# Patient Record
Sex: FEMALE | Race: WHITE | Employment: FULL TIME | URBAN - METROPOLITAN AREA
[De-identification: names, ages, dates, MRNs, and addresses within clinical notes are randomized per-mention and may not be internally consistent; named-entity substitution may affect disease eponyms.]

---

## 2017-02-01 ENCOUNTER — HOSPITAL ENCOUNTER (OUTPATIENT)
Dept: BONE DENSITY | Age: 72
Discharge: HOME | End: 2017-02-01
Attending: FAMILY MEDICINE
Payer: MEDICARE

## 2017-02-01 DIAGNOSIS — Z78.0 ASYMPTOMATIC MENOPAUSAL STATE: ICD-10-CM

## 2017-02-01 PROCEDURE — 77080 DXA BONE DENSITY AXIAL: CPT

## 2017-03-30 ENCOUNTER — HOSPITAL ENCOUNTER (OUTPATIENT)
Dept: GENERAL RADIOLOGY | Age: 72
Discharge: HOME | End: 2017-03-30
Payer: MEDICARE

## 2017-03-30 DIAGNOSIS — J20.9 ACUTE BRONCHITIS: ICD-10-CM

## 2017-03-30 PROCEDURE — 71020 XR CHEST PA LAT: CPT

## 2017-05-02 DIAGNOSIS — R05.9 COUGH: Primary | ICD-10-CM

## 2017-05-02 NOTE — PROGRESS NOTES
Verbal Order with read back per Dr. Juanito Velasquez MD  For PFT smart panel. AMB POC PFT complete w/ bronchodilator  AMB POC PFT complete w/o bronchodilator    Dr. Juanito Velasquez MD will co-sign the orders.

## 2017-05-09 ENCOUNTER — OFFICE VISIT (OUTPATIENT)
Dept: PULMONOLOGY | Age: 72
End: 2017-05-09

## 2017-05-09 VITALS
HEIGHT: 64 IN | SYSTOLIC BLOOD PRESSURE: 150 MMHG | DIASTOLIC BLOOD PRESSURE: 80 MMHG | BODY MASS INDEX: 23.56 KG/M2 | TEMPERATURE: 97.7 F | OXYGEN SATURATION: 98 % | RESPIRATION RATE: 20 BRPM | WEIGHT: 138 LBS | HEART RATE: 75 BPM

## 2017-05-09 DIAGNOSIS — R05.9 COUGH: ICD-10-CM

## 2017-05-09 RX ORDER — HYDROCHLOROTHIAZIDE 25 MG/1
25 TABLET ORAL DAILY
COMMUNITY

## 2017-05-09 RX ORDER — BENZONATATE 100 MG/1
100 CAPSULE ORAL
COMMUNITY

## 2017-05-09 RX ORDER — ASPIRIN 81 MG/1
TABLET ORAL DAILY
COMMUNITY

## 2017-05-09 NOTE — MR AVS SNAPSHOT
Visit Information Date & Time Provider Department Dept. Phone Encounter #  
 5/9/2017  9:30 [facility] [id number] Follow-up Instructions Return in about 10 weeks (around 7/18/2017). Upcoming Health Maintenance Date Due Hepatitis C Screening 1945 DTaP/Tdap/Td series (1 - Tdap) 6/4/1967 ZOSTER VACCINE AGE 60> 6/4/2006 MEDICARE YEARLY EXAM 6/4/2011 GLAUCOMA SCREENING Q2Y 3/3/2016 COLONOSCOPY 2/3/2017 Pneumococcal 65+ Low/Medium Risk (2 of 2 - PPSV23) 6/11/2017 INFLUENZA AGE 9 TO ADULT 8/1/2017 BREAST CANCER SCRN MAMMOGRAM 8/4/2018 Allergies as of 5/9/2017  Review Complete On: 5/9/2017 By: RT Susi No Known Allergies Current Immunizations  Reviewed on 12/8/2014 Name Date Influenza High Dose Vaccine PF 11/9/2016 Influenza Vaccine 12/8/2014 Influenza Vaccine Whole 11/9/2011 Pneumococcal Vaccine (Unspecified Type) 6/11/2012 Not reviewed this visit You Were Diagnosed With   
  
 Codes Comments Cough     ICD-10-CM: R05 ICD-9-CM: 392. 2 Vitals BP Pulse Temp Resp Height(growth percentile) Weight(growth percentile) 150/80 (BP 1 Location: Left arm, BP Patient Position: At rest) 75 97.7 °F (36.5 °C) (Oral) 20 5' 4\" (1.626 m) 138 lb (62.6 kg) SpO2 BMI OB Status Smoking Status 98% 23.69 kg/m2 Postmenopausal Never Smoker BMI and BSA Data Body Mass Index Body Surface Area  
 23.69 kg/m 2 1.68 m 2 Preferred Pharmacy Pharmacy Name Phone [id number] [address] [phone] Your Updated Medication List  
  
   
This list is accurate as of: 5/9/17 10:12 AM.  Always use your most recent med list.  
  
  
  
  
 aspirin delayed-release 81 mg tablet Take  by mouth daily. AZOPT 1 % ophthalmic suspension Generic drug:  brinzolamide Administer 1 Drop to both eyes daily. benzonatate 100 mg capsule Commonly known as:  TESSALON Take 100 mg by mouth three (3) times daily as needed for Cough. cholecalciferol (VITAMIN D3) 5,000 unit Tab tablet Commonly known as:  VITAMIN D3 Take 5,000 Units by mouth daily. cyanocobalamin 500 mcg tablet Commonly known as:  VITAMIN B-12 Take 1 tablet by mouth daily. hydroCHLOROthiazide 25 mg tablet Commonly known as:  HYDRODIURIL Take 25 mg by mouth daily. olmesartan 20 mg tablet Commonly known as:  [employer] Take 20 mg by mouth daily. omeprazole 40 mg capsule Commonly known as:  PRILOSEC  
TAKE 1 CAPSULE DAILY BEFORE BREAKFAST *PLEASE CONTACT MD TO SCHEDULE FOLLOW UP FOR FUTURE REFILLS*  
  
 TUMS PO Take  by mouth nightly. XALATAN 0.005 % ophthalmic solution Generic drug:  latanoprost  
Administer 1 Drop to both eyes nightly. We Performed the Following AMB POC SPIROMETRY W/O BRONCHODILATOR [51036 CPT(R)] Follow-up Instructions Return in about 10 weeks (around 7/18/2017). Introducing [facility]! [facility] introduces [employer] patient portal. Now you can access parts of your medical record, email your doctor's office, and request medication refills online. 1. In your internet browser, go to [email or web address] 2. Click on the First Time User? Click Here link in the Sign In box. You will see the New Member Sign Up page. 3. Enter your [employer] Access Code exactly as it appears below. You will not need to use this code after youve completed the sign-up process. If you do not sign up before the expiration date, you must request a new code. · [employer] Access Code: [email or web address] Expires: 6/28/2017  9:15 AM 
 
4. Enter the last four digits of your Social Security Number (xxxx) and Date of Birth (mm/dd/yyyy) as indicated and click Submit. You will be taken to the next sign-up page. 5. Create a Nveloped ID. This will be your [employer] login ID and cannot be changed, so think of one that is secure and easy to remember. 6. Create a [employer]velop[employer] password. You can change your password at any time. 7. Enter your Password Reset Question and Answer. This can be used at a later time if you forget your password. 8. Enter your e-mail address. You will receive e-mail notification when new information is available in [address] 9. Click Sign Up. You can now view and download portions of your medical record. 10. Click the Download Summary menu link to download a portable copy of your medical information. If you have questions, please visit the Frequently Asked Questions section of the [employer] website. Remember, [employer] is NOT to be used for urgent needs. For medical emergencies, dial 911. Now available from your iPhone and Android! Please provide this summary of care documentation to your next provider. Your primary care clinician is listed as [address]. If you have any questions after today's visit, please call [phone].

## 2017-05-09 NOTE — PATIENT INSTRUCTIONS
Over-the-counter Prilosec 20 mg to 40 mg a day  Do not eat for 2 hours 4 bedtime  Sleep with the head of her bed elevated. This can be accomplished with 4 inch wooden blocks.   The top of the block should be cut out so the headposts can't fit into the head of the blocks and preventive bed from slipping  Call for worsening or change in symptoms

## 2017-05-09 NOTE — PROGRESS NOTES
[facility]  Pulmonary, Critical Care, and Sleep Medicine    Dear Jeannie Montgomery,    Chief complaint:  cough    HPI:  Gurmeet Franco is 80years old and comes to the office today to request concerning a cough which has bothered her for at least 6 months. She characterizes the cough is primarily dry although she had one episode of a \"cold\"where it may have been productive and this has resolved. The cough is not associated with chest pain shortness of breath and leg pain or swelling significant allergy symptoms or sinus symptoms. The patient also relates she was taking an ARB in the past which was discontinued when she developed a cough but the cough has persisted though helped with Benzonatate. The patient does relate that she has frequent heartburn symptoms and has had an esophageal stretching in the past.  She also relates that for many years she has noticed some mild coughing when she swallows food and liquids                   No Known Allergies  Current Outpatient Prescriptions   Medication Sig    hydroCHLOROthiazide (HYDRODIURIL) 25 mg tablet Take 25 mg by mouth daily.  benzonatate (TESSALON) 100 mg capsule Take 100 mg by mouth three (3) times daily as needed for Cough.  aspirin delayed-release 81 mg tablet Take  by mouth daily.  CALCIUM CARBONATE (TUMS PO) Take  by mouth nightly.  cyanocobalamin (VITAMIN B-12) 500 mcg tablet Take 1 tablet by mouth daily.  Cholecalciferol, Vitamin D3, (VITAMIN D3) 5,000 unit Tab Take 5,000 Units by mouth daily.  latanoprost (XALATAN) 0.005 % ophthalmic solution Administer 1 Drop to both eyes nightly.  brinzolamide (AZOPT) 1 % ophthalmic suspension Administer 1 Drop to both eyes daily.  olmesartan (BENICAR) 20 mg tablet Take 20 mg by mouth daily.     omeprazole (PRILOSEC) 40 mg capsule TAKE 1 CAPSULE DAILY BEFORE BREAKFAST *PLEASE CONTACT MD TO SCHEDULE FOLLOW UP FOR FUTURE REFILLS*     No current facility-administered medications for this visit.       Past Medical History:   Diagnosis Date    Diverticulitis     GERD (gastroesophageal reflux disease)     Glaucoma     Hiatal hernia     HTN (hypertension)     Hypercholesterolemia     Hypovitaminosis D 12/17/2012    Malaria     Migraine     Osteoporosis     Peptic stricture of esophagus 4/7/2014    s/p esophageal dilatation by Dr. Jade Hence   she denies a history of heart disease diabetes or kidney disease liver disease or ulcers tuberculosis cancer or asthma or emphysema  Past Surgical History:   Procedure Laterality Date    BIOPSY OF BREAST, INCISIONAL Left 1972    HX COLONOSCOPY  April 07, 2014    normal (Dr. Jade Hence)    HX CYST INCISION AND DRAINAGE  1974    HX POLYPECTOMY  2010    HX TONSILLECTOMY  1949    OH COLONOSCOPY FLX DX W/COLLJ [address] WHEN PFRMD  2010    (+)polyp   esophageal stretching    Family history: lung cancer       Social History: works as an   Lifelong nonsmoker     Review of systems:  She denies syncope or focal muscle weakness and trouble hearing trouble seen chronic abdominal pain melena or blood in her stool or dysuria hematuria rash is significant arthritis fever chills or night sweats poor appetite or weight loss    Physical Exam:  Visit Vitals    /80 (BP 1 Location: Left arm, BP Patient Position: At rest)    Pulse 75    Temp 97.7 °F (36.5 °C) (Oral)    Resp 20    Ht 5' 4\" (1.626 m)    Wt 62.6 kg (138 lb)    SpO2 98%    BMI 23.69 kg/m2     Well-developed well-nourished  HEENT: pupils equal, reactive, sclera, non-icteric   Oropharynx tongue: normal   Neck: Supple   Lymph Nodes: Supra clavicular and cervical nodes, negative   Chest: Equal symmetrical expansion, no dullness, no wheezes, rales or rubs   Heart: Regular rhythm without leonie or murmur no carotid bruits  Abdomen: soft, non-tender no masses or organomegaly   Extremities: no cyanosis, clubbing, no edema   Skin: No rash  Musculoskeletal: No acute joint inflammation or muscle wasting   Neurological: alert, oriented, moves all extremities      LABS:  O2 sat room air at rest 98% spirometry 5/9/17: Normal study  Chest x-ray 3/30/17 personally reviewed: Normal appearance    Impression:   Chronic cough and the cause is not clear however the history suggests 2 possibilities including aspiration from gastroesophageal reflux or aspiration from swallowing    Plan:  Evaluate aspiration with a swallow eval  Empiric treatment for aspiration due to gastroesophageal reflux with proton inhibitor no eating for 2 hours before sleep and elevation of the head of the bed  Followup in approximately 8-10 weeks    Thanks for me to share in this patient's evaluation    Sincerely,    Day Higgins MD , [facility]    CC: Zoran Rogers MD    [address] [facility]  [place], [address] [address]     P: [phone]     F: [phone]

## 2017-11-08 ENCOUNTER — HOSPITAL ENCOUNTER (OUTPATIENT)
Dept: MAMMOGRAPHY | Age: 72
Discharge: HOME | End: 2017-11-08
Attending: FAMILY MEDICINE
Payer: MEDICARE

## 2017-11-08 DIAGNOSIS — Z12.31 VISIT FOR SCREENING MAMMOGRAM: ICD-10-CM

## 2017-11-08 PROCEDURE — 77063 BREAST TOMOSYNTHESIS BI: CPT

## 2017-12-04 ENCOUNTER — HOSPITAL ENCOUNTER (OUTPATIENT)
Dept: MAMMOGRAPHY | Age: 72
Discharge: HOME | End: 2017-12-04
Attending: FAMILY MEDICINE
Payer: MEDICARE

## 2017-12-04 ENCOUNTER — APPOINTMENT (OUTPATIENT)
Dept: ULTRASOUND IMAGING | Age: 72
End: 2017-12-04
Attending: FAMILY MEDICINE
Payer: MEDICARE

## 2017-12-04 DIAGNOSIS — R92.8 ABNORMAL MAMMOGRAM: ICD-10-CM

## 2017-12-04 PROCEDURE — 77065 DX MAMMO INCL CAD UNI: CPT

## 2018-11-12 ENCOUNTER — HOSPITAL ENCOUNTER (OUTPATIENT)
Dept: MAMMOGRAPHY | Age: 73
Discharge: HOME | End: 2018-11-12
Attending: FAMILY MEDICINE
Payer: MEDICARE

## 2018-11-12 DIAGNOSIS — R92.8 ABNORMAL MAMMOGRAM: ICD-10-CM

## 2018-11-12 DIAGNOSIS — R92.1 BREAST CALCIFICATIONS: ICD-10-CM

## 2018-11-12 PROCEDURE — 77066 DX MAMMO INCL CAD BI: CPT

## 2019-08-12 ENCOUNTER — HOSPITAL ENCOUNTER (OUTPATIENT)
Dept: MAMMOGRAPHY | Age: 74
Discharge: HOME | End: 2019-08-12
Attending: FAMILY MEDICINE
Payer: MEDICARE

## 2019-08-12 DIAGNOSIS — R92.8 ABNORMAL MAMMOGRAPHY: ICD-10-CM

## 2019-08-12 PROCEDURE — 77061 BREAST TOMOSYNTHESIS UNI: CPT
